# Patient Record
Sex: MALE | Race: BLACK OR AFRICAN AMERICAN | URBAN - METROPOLITAN AREA
[De-identification: names, ages, dates, MRNs, and addresses within clinical notes are randomized per-mention and may not be internally consistent; named-entity substitution may affect disease eponyms.]

---

## 2023-03-09 ENCOUNTER — APPOINTMENT (RX ONLY)
Dept: URBAN - METROPOLITAN AREA CLINIC 354 | Facility: CLINIC | Age: 38
Setting detail: DERMATOLOGY
End: 2023-03-09

## 2023-03-09 DIAGNOSIS — L91.8 OTHER HYPERTROPHIC DISORDERS OF THE SKIN: ICD-10-CM | Status: INADEQUATELY CONTROLLED

## 2023-03-09 DIAGNOSIS — L90.5 SCAR CONDITIONS AND FIBROSIS OF SKIN: ICD-10-CM

## 2023-03-09 DIAGNOSIS — L60.3 NAIL DYSTROPHY: ICD-10-CM | Status: INADEQUATELY CONTROLLED

## 2023-03-09 DIAGNOSIS — L81.0 POSTINFLAMMATORY HYPERPIGMENTATION: ICD-10-CM | Status: INADEQUATELY CONTROLLED

## 2023-03-09 PROCEDURE — 99203 OFFICE O/P NEW LOW 30 MIN: CPT | Mod: 25

## 2023-03-09 PROCEDURE — ? COSMETIC CONSULTATION: MICRONEEDLING

## 2023-03-09 PROCEDURE — ? TREATMENT REGIMEN

## 2023-03-09 PROCEDURE — ? PRESCRIPTION MEDICATION MANAGEMENT

## 2023-03-09 PROCEDURE — ? SKIN TAG REMOVAL

## 2023-03-09 PROCEDURE — ? COUNSELING

## 2023-03-09 PROCEDURE — ? PRESCRIPTION

## 2023-03-09 PROCEDURE — 11200 RMVL SKIN TAGS UP TO&INC 15: CPT

## 2023-03-09 RX ORDER — TRETIONIN 0.5 MG/G
CREAM TOPICAL
Qty: 45 | Refills: 2 | Status: ERX | COMMUNITY
Start: 2023-03-09

## 2023-03-09 RX ORDER — HYDROQUINONE 6 %
EMULSION (GRAM) TOPICAL
Qty: 30 | Refills: 1 | Status: ERX | COMMUNITY
Start: 2023-03-09

## 2023-03-09 RX ADMIN — Medication: at 00:00

## 2023-03-09 RX ADMIN — TRETIONIN: 0.5 CREAM TOPICAL at 00:00

## 2023-03-09 ASSESSMENT — LOCATION SIMPLE DESCRIPTION DERM
LOCATION SIMPLE: RIGHT GREAT TOE
LOCATION SIMPLE: LEFT 2ND TOE
LOCATION SIMPLE: RIGHT 4TH TOE
LOCATION SIMPLE: RIGHT 2ND TOE
LOCATION SIMPLE: GROIN
LOCATION SIMPLE: LEFT 5TH TOE
LOCATION SIMPLE: LEFT GREAT TOE
LOCATION SIMPLE: RIGHT CHEEK
LOCATION SIMPLE: RIGHT 5TH TOE
LOCATION SIMPLE: RIGHT 3RD TOE
LOCATION SIMPLE: LEFT 3RD TOE
LOCATION SIMPLE: LEFT 4TH TOE
LOCATION SIMPLE: NOSE
LOCATION SIMPLE: LEFT CHEEK

## 2023-03-09 ASSESSMENT — LOCATION DETAILED DESCRIPTION DERM
LOCATION DETAILED: RIGHT CENTRAL MALAR CHEEK
LOCATION DETAILED: LEFT 4TH TOENAIL
LOCATION DETAILED: LEFT 2ND TOENAIL
LOCATION DETAILED: LEFT DORSAL 3RD TOE
LOCATION DETAILED: RIGHT MEDIAL 4TH TOE
LOCATION DETAILED: RIGHT SUPERIOR CENTRAL MALAR CHEEK
LOCATION DETAILED: RIGHT DORSAL 3RD TOE
LOCATION DETAILED: LEFT GREAT TOENAIL
LOCATION DETAILED: RIGHT 5TH TOENAIL
LOCATION DETAILED: LEFT CENTRAL MALAR CHEEK
LOCATION DETAILED: RIGHT LATERAL 2ND TOE
LOCATION DETAILED: RIGHT GREAT TOENAIL
LOCATION DETAILED: LEFT SUPERIOR CENTRAL MALAR CHEEK
LOCATION DETAILED: NASAL DORSUM
LOCATION DETAILED: RIGHT INGUINAL CREASE
LOCATION DETAILED: LEFT 5TH TOENAIL

## 2023-03-09 ASSESSMENT — LOCATION ZONE DERM
LOCATION ZONE: TOENAIL
LOCATION ZONE: NOSE
LOCATION ZONE: TOE
LOCATION ZONE: TRUNK
LOCATION ZONE: FACE

## 2023-03-09 NOTE — HPI: ACNE (PATIENT REPORTED)
Where Is Your Acne Located?: Face
Additional Comments (Use Complete Sentences): Pt reports using OTC cleansers.

## 2023-03-09 NOTE — PROCEDURE: SKIN TAG REMOVAL
Medical Necessity Information: It is in your best interest to select a reason for this procedure from the list below. All of these items fulfill various CMS LCD requirements except the new and changing color options.
Hemostasis: Aluminum Chloride
Detail Level: Detailed
Anesthesia Volume In Cc: 3
Anesthesia Type: 1% lidocaine with epinephrine
Include Z78.9 (Other Specified Conditions Influencing Health Status) As An Associated Diagnosis?: No
Add Associated Diagnoses If Applicable When Selecting Medical Necessity: Yes
Medical Necessity Clause: This procedure was medically necessary because the lesions that were treated were:
Consent: Written consent obtained and the risks of skin tag removal was reviewed with the patient including but not limited to bleeding, pigmentary change, infection, pain, and remote possibility of scarring.

## 2023-03-09 NOTE — PROCEDURE: PRESCRIPTION MEDICATION MANAGEMENT
Initiate Treatment: Kexm 6 % topical emulsion \\nApply to aa of face qhs for 2-3 months
Detail Level: Zone
Render In Strict Bullet Format?: No
Initiate Treatment: tretinoin 0.05 % topical cream \\nApply pea size amount to aa qohs
Plan: Discussed topical retin-a as tx option. Pt aware to stop tretinoin rx a few weeks before wedding date if experiencing dryness

## 2023-03-09 NOTE — PROCEDURE: TREATMENT REGIMEN
Detail Level: Zone
Plan: Discussed discoloration can be from various causes: such as friction from tight tennis shoes, genetics or unknown etiology
Plan: Per pt this is a result from improperly fitting glasses. Sunscreen care reviewed.
Plan: Discussed microneedling as tx option

## 2023-03-14 ENCOUNTER — RX ONLY (OUTPATIENT)
Age: 38
Setting detail: RX ONLY
End: 2023-03-14

## 2023-03-14 RX ORDER — TRETINOIN 0.5 MG/G
GEL TOPICAL
Qty: 45 | Refills: 2 | Status: ERX | COMMUNITY
Start: 2023-03-14